# Patient Record
Sex: MALE | Race: WHITE | NOT HISPANIC OR LATINO | URBAN - METROPOLITAN AREA
[De-identification: names, ages, dates, MRNs, and addresses within clinical notes are randomized per-mention and may not be internally consistent; named-entity substitution may affect disease eponyms.]

---

## 2017-01-01 ENCOUNTER — TRANSFERRED RECORDS (OUTPATIENT)
Dept: HEALTH INFORMATION MANAGEMENT | Facility: CLINIC | Age: 67
End: 2017-01-01

## 2018-01-01 ENCOUNTER — HOSPITAL ENCOUNTER (OUTPATIENT)
Dept: INTERVENTIONAL RADIOLOGY/VASCULAR | Facility: HOSPITAL | Age: 68
Discharge: HOME | End: 2018-03-07
Attending: PHYSICIAN ASSISTANT

## 2018-01-01 ENCOUNTER — HOSPITAL ENCOUNTER (OUTPATIENT)
Dept: INTERVENTIONAL RADIOLOGY/VASCULAR | Facility: HOSPITAL | Age: 68
Discharge: HOME | End: 2018-03-07
Attending: UROLOGY | Admitting: RADIOLOGY

## 2018-01-01 ENCOUNTER — RECORDS - HEALTHEAST (OUTPATIENT)
Dept: ADMINISTRATIVE | Facility: OTHER | Age: 68
End: 2018-01-01

## 2018-01-01 ENCOUNTER — RECORDS - HEALTHEAST (OUTPATIENT)
Dept: LAB | Facility: CLINIC | Age: 68
End: 2018-01-01

## 2018-01-01 ENCOUNTER — HOSPITAL ENCOUNTER (OUTPATIENT)
Dept: CT IMAGING | Facility: HOSPITAL | Age: 68
Discharge: HOME | End: 2018-03-07
Attending: UROLOGY

## 2018-01-01 DIAGNOSIS — R32 URINE INCONTINENCE: ICD-10-CM

## 2018-01-01 LAB
ANION GAP SERPL CALCULATED.3IONS-SCNC: 11 MMOL/L (ref 5–18)
BUN SERPL-MCNC: 22 MG/DL (ref 8–22)
CALCIUM SERPL-MCNC: 9.3 MG/DL (ref 8.5–10.5)
CHLORIDE BLD-SCNC: 101 MMOL/L (ref 98–107)
CO2 SERPL-SCNC: 27 MMOL/L (ref 22–31)
CREAT SERPL-MCNC: 1.12 MG/DL (ref 0.7–1.3)
GFR SERPL CREATININE-BSD FRML MDRD: >60 ML/MIN/1.73M2
GLUCOSE BLD-MCNC: 204 MG/DL (ref 70–125)
GLUCOSE BLDC GLUCOMTR-MCNC: 159 MG/DL
GLUCOSE BLDC GLUCOMTR-MCNC: 235 MG/DL
HGB BLD-MCNC: 12.2 G/DL (ref 14–18)
INR PPP: 1.22 (ref 0.9–1.1)
PLATELET # BLD AUTO: 224 THOU/UL (ref 140–440)
POTASSIUM BLD-SCNC: 3.7 MMOL/L (ref 3.5–5)
SODIUM SERPL-SCNC: 139 MMOL/L (ref 136–145)

## 2018-01-01 ASSESSMENT — MIFFLIN-ST. JEOR: SCORE: 1760.98

## 2021-05-31 VITALS — BODY MASS INDEX: 36.41 KG/M2 | WEIGHT: 232 LBS | HEIGHT: 67 IN

## 2021-06-16 NOTE — SEDATION DOCUMENTATION
Suprapubic catheter not draining, will check placement and possibly change with fentanyl as needed per Dr. Laura

## 2021-06-16 NOTE — PROGRESS NOTES
Olivares not draining, tubing checked for kinking, no kinks noted. Scant bloody drainage noted on dressing. Patient's underpants dry.  Dr. Laura notified, Recieved a VO to run 500cc NS now. Reviewed patient's cardiac HX with Dr. Laura prior to starting fluid bolus.

## 2021-06-16 NOTE — SEDATION DOCUMENTATION
Patient continue's to be in severe pain. An additional 100 mcg of Fentanyl given per Dr. Laura's order

## 2021-06-16 NOTE — PROGRESS NOTES
Reviewed DC instructions with friend Reynaldo and Jack, understanding verbalized. Patient DC'd with Reynaldo via W/C

## 2021-06-16 NOTE — PROCEDURES
INTERVENTIONAL RADIOLOGY PROCEDURE NOTE    Patient Name: Jack Soriano  Medical Record Number: [id number]  YOB: 1950    Date/Time: 3/7/2018 9:21 AM    Procedure: Suprapubic catheter placement     Diagnosis: Urinary Tract obstruction    Medications: Versed 3mg IV and Fentanyl 150mcg IV    Contrast: Cysto-Conray II, 150mL    Fluoroscopy Time: 2 min    EBL: None    Complications: None    Specimens Sent: None    Findings: Successful SP cath placement    Plan: [place] drainage    Jack Laura

## 2021-06-16 NOTE — PROCEDURES
INTERVENTIONAL RADIOLOGY PROCEDURE NOTE    Patient Name: Jack Soriano  Medical Record Number: [id number]  YOB: 1950    Date/Time: 3/7/2018 1:37 PM    Procedure: SP cath placement     Diagnosis: Urinary Tract obstruction    Medications: Fentanyl 650mcg IV    Contrast: Omnipaque 350, 10mL    Fluoroscopy Time: 11.3 mins    EBL: None    Complications: None    Specimens Sent: None    Findings: The patient had significant pain on attempted reinsertion of the SP cath into the bladder despite 650 mcg of Fentanyl. As such, a 10F locking drainage catheter was placed.    Plan: [place] drainage. Plan for routine exchange in 3 months or PRN if issues arise.     Jack Laura

## 2021-06-16 NOTE — SEDATION DOCUMENTATION
Patient c/o severe pain in is bladder, giving IV fentanyl as directed by Dr. Laura to manage pain.

## 2021-06-16 NOTE — H&P
[facility] Radiology History and Physical Note    Procedure Requested: SP placement  Requesting Provider: Raimundo Bernard MD    HPI: Jack Soriano is a 67 y.o. 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 Here for SP placement at request of urology.    Of note- patient presently on home hospice secondary to metastatic chondrosarcoma, however, patient likely will be entering a facility hospice center in the near future as he recently fell at home.    NPO Status: midnight.  Anticoagulation/Antiplatelets/Bleeding tendencies: baby aspirin.  Antibiotics: cipro ordered    REVIEW OF SYMPTOMS: Denies recent fevers, chills, night sweats, abdominal pain, N/V/D.      PAST MEDICAL HISTORY:   Past Medical History:   Diagnosis Date     Cardiomyopathy      Chronic kidney disease      Coronary artery disease      Diabetes mellitus      Edema      Hyperlipidemia      Hypertension      Neuropathy      Obesity      Osteoarthritis (arthritis due to wear and tear of joints)      Osteosarcoma      Prostate cancer      Radiation adverse effect      Spinal compression fracture, sequela     lumbar 1       PAST SURGICAL HISTORY:  Past Surgical History:   Procedure Laterality Date     ARTHROTOMY Left     knee     CORONARY ANGIOPLASTY       CORONARY ARTERY BYPASS GRAFT       CORONARY STENT PLACEMENT       ROTATOR CUFF REPAIR Left      TRANSURETHRAL RESECTION OF PROSTATE         ALLERGIES:  Amlodipine    MEDICATIONS:  Current Outpatient Prescriptions   Medication Sig Dispense Refill     aspirin 81 mg chewable tablet Chew 81 mg daily.       atorvastatin (LIPITOR) 40 MG tablet Take 80 mg by mouth at bedtime.       cholecalciferol, vitamin D3, 5,000 unit Tab Take by mouth.       famotidine (PEPCID) 20 MG tablet Take 20 mg by mouth  Daily at 8:00 am..       furosemide (LASIX) 80 MG tablet Take 80 mg by mouth daily.       HYDROmorphone (DILAUDID) 2 MG tablet Take 2 mg by mouth every 2 (two) hours as needed for pain.       insulin aspart U-100 (NOVOLOG) 100 unit/mL injection pen Inject 15 Units under the skin 3 (three) times a day before meals.       insulin glargine (LANTUS) 100 unit/mL injection Inject 20 Units under the skin 2 (two) times a day.       losartan (COZAAR) 25 MG tablet Take 25 mg by mouth daily.       metFORMIN (GLUCOPHAGE) 1000 MG tablet Take 1,000 mg by mouth 2 (two) times a day with meals.       morphine (MS CONTIN) 15 MG 12 hr tablet Take 15 mg by mouth every 12 (twelve) hours.       morphine 100 mg/5 mL (20 mg/mL) concentrated solution Take 5-15 mg by mouth every 2 (two) hours as needed for pain.       multivitamin with minerals (THERA-M) 9 mg iron-400 mcg Tab tablet Take 1 tablet by mouth daily.       nitroglycerin (NITROSTAT) 0.4 MG SL tablet Place 0.4 mg under the tongue every 5 (five) minutes as needed for chest pain.       polyethylene glycol (MIRALAX) 17 gram packet Take 17 g by mouth daily.       potassium chloride SA (K-DUR,KLOR-CON) 20 MEQ tablet Take 20 mEq by mouth 3 (three) times a day.       senna-docusate (SENNOSIDES-DOCUSATE SODIUM) 8.6-50 mg tablet Take 1 tablet by mouth 2 (two) times a day.       silver sulfADIAZINE (SILVADENE, SSD) 1 % cream Apply topically daily.       ticagrelor (BRILINTA) 90 mg Tab Take by mouth 2 (two) times a day.       vitamin E 1000 UNIT capsule Take 1,000 Units by mouth daily.       zoledronic acid (ZOMETA) 4 mg/5 mL injection Infuse 4 mg into a venous catheter every 28 days.       Current Facility-Administered Medications   Medication Dose Route Frequency Provider Last Rate Last Dose     ciprofloxacin IVPB 400 mg (CIPRO)  400 mg Intravenous 60 Min Pre-Op Day Santana PA-C         lidocaine 10 mg/mL (1 %) injection 0.1-0.3 mL  0.1-0.3 mL Subcutaneous PRN Day Vital  "Marysol Santana PA-C         sodium chloride flush 3 mL (NS)  3 mL Intravenous Line Care Day Vital Marysol Santana PA-C           LABS:  INR (no units)   Date Value   03/07/2018 1.22 (H)     Hemoglobin (g/dL)   Date Value   03/07/2018 12.2 (L)     Platelets (thou/uL)   Date Value   03/07/2018 224     Creatinine (mg/dL)   Date Value   01/02/2018 1.12     Potassium (mmol/L)   Date Value   01/02/2018 3.7       EXAM:  /71  Pulse (!) 107  Temp 98.6  F (37  C) (Oral)   Resp 18  Ht 5' 7\" (1.702 m)  Wt (!) 232 lb (105.2 kg)  SpO2 96%  BMI 36.34 kg/m2  General: Stable. In no acute distress.  Neuro: A&O x 3. Moves all extremities equally.  Resp: Lungs clear to auscultation bilaterally.  Cardio: S1S2 and reg, without murmur, clicks or rubs  Abdomen: Soft, non-distended, non-tender.    Pre-Sedation Assessment:  Mallampati Airway Classification: Class 3: soft and hard palates clearly visible  Previous reaction to anesthesia/sedation: no  Sedation plan based on assessment: Moderate  Sleep Apnea: no  Dentures: no  COPD: no  ASA Classification: ASA 3 - Patient with moderate systemic disease with functional limitations  Comments: no hx of asthma.    ASSESSMENT: 68 yo male with hx of prostate cancer s/p TURP with artificial urinary sphincter implant, and progressively worsening stress incontinence     PLAN: percutaneous SP placement with sedation.    The procedure, risks and moderate sedation were discussed with the patient, all questions answered and patient agrees to proceed with the procedure. Written consent obtained.    Day Santana PA-C  Interventional Radiology    "